# Patient Record
(demographics unavailable — no encounter records)

---

## 2017-03-15 NOTE — ERD
ER Documentation


Chief Complaint


Date/Time


DATE: 3/15/17 


TIME: 15:31


Chief Complaint


HEADACHE WITH COUGH AND CONGESTION WITH AP AND NAUSEA AND VOMITING





HPI


This is a 61-year-old female who presents to the emergency room for evaluation 

of a headache, nasal congestion, cough, body aches, and frequent urination for 

the past 3 days.  This patient is here with her daughter who states the patient 

has been stressed recently and has been in the hospital due to the fact that 

her family member is sick.  This patient has not had any fevers at home and 

came to the ER today for evaluation.  She denies coughing up any phlegm.





ROS


All systems reviewed and are negative except as per history of present illness.





PMhx/Soc


Medical and Surgical Hx:  pt denies Medical Hx


History of Surgery:  Yes (bunnion hysterectomy)


Anesthesia Reaction:  No


Hx Neurological Disorder:  No


Hx Respiratory Disorders:  No


Hx Cardiac Disorders:  No


Hx Psychiatric Problems:  No


Hx Miscellaneous Medical Probl:  No


Hx Alcohol Use:  No


Hx Substance Use:  No


Hx Tobacco Use:  No


Smoking Status:  Never smoker





Physical Exam


Vitals





Vital Signs








  Date Time  Temp Pulse Resp B/P Pulse Ox O2 Delivery O2 Flow Rate FiO2


 


3/15/17 12:26 99.9 105 22 129/72 98   








Physical Exam


INITIAL VITAL SIGNS: Reviewed by me


GENERAL:  The patient is well developed and appropriate for usual state of 

health in no apparent distress


HEENT: Pupils equal, round, and reactive to light.  EOMI. There is no scleral 

icterus.


NECK:  C-spine is soft and supple, there is no meningismus.  There is no 

cervical lymphadenopathy.


LUNGS:  Clear to auscultation bilaterally. There are no rales, wheezes or 

rhonchi.


HEART:  Regular rate and rhythm, no murmurs, clicks, rubs or gallops.


ABDOMEN:  Soft, non-tender, non-distended.  There are bowel sounds in all four 

quadrants. No rebound or guarding.


EXTREMITIES:  There is no peripheral cyanosis or edema.  No focal swelling or 

erythema.


NEUROLOGICAL:  The patient moves all four extremities with 5/5 strength.  

Cranial nerves II - XII are intact. Normal gait. Alert and oriented


SKIN:  There is no apparent rash or petechiae.


HEME/LYMPHATIC:  There is no evidence of excessive bruising or lymphedema.


PSYCHIATRIC:  The patient does not appear anxious or depressed.


Result Diagram:  


3/15/17 1415                                                                   

             3/15/17 1415





Results 24 hrs





 Laboratory Tests








Test


  3/15/17


14:15 3/15/17


15:05


 


Alanine Aminotransferase


(ALT/SGPT) 26IU/L 


  


 


 


Albumin 4.2g/dl  


 


Albumin/Globulin Ratio 1.23  


 


Alkaline Phosphatase 93IU/L  


 


Anion Gap 18  


 


Aspartate Amino Transf


(AST/SGOT) 28IU/L 


  


 


 


Basophils # 0.010^3/ul  


 


Basophils % 0.6%  


 


Blood Urea Nitrogen 16mg/dl  


 


Calcium Level 9.1mg/dl  


 


Carbon Dioxide Level 25mmol/L  


 


Chloride Level 102mmol/L  


 


Creatinine 0.83mg/dl  


 


Direct Bilirubin 0.00mg/dl  


 


Eosinophils # 0.010^3/ul  


 


Eosinophils % 0.0%  


 


Globulin 3.40g/dl  


 


Glucose Level 94mg/dl  


 


Hematocrit 42.3%  


 


Hemoglobin 14.6g/dl  


 


Indirect Bilirubin 0.3mg/dl  


 


Lipase 156U/L  


 


Lymphocytes # 1.110^3/ul  


 


Lymphocytes % 21.5%  


 


Mean Corpuscular Hemoglobin 31.3pg  


 


Mean Corpuscular Hemoglobin


Concent 34.5g/dl 


  


 


 


Mean Corpuscular Volume 90.8fl  


 


Mean Platelet Volume 10.7fl  


 


Monocytes # 0.710^3/ul  


 


Monocytes % 13.3%  


 


Neutrophils # 3.110^3/ul  


 


Neutrophils % 64.2%  


 


Nucleated Red Blood Cells # 0.010^3/ul  


 


Nucleated Red Blood Cells % 0.0/100WBC  


 


Platelet Count 85812^3/UL  


 


Potassium Level 4.7mmol/L  


 


Red Blood Count 4.6610^6/ul  


 


Red Cell Distribution Width 13.0%  


 


Sodium Level 140mmol/L  


 


Total Bilirubin 0.3mg/dl  


 


Total Protein 7.6g/dl  


 


White Blood Count 4.910^3/ul  


 


Urine Bacteria  FEW 


 


Urine Bilirubin  NEGATIVE 


 


Urine Clarity  CLEAR 


 


Urine Color  LT. YELLOW 


 


Urine Epithelial Cells  FEW 


 


Urine Glucose  NEGATIVE% 


 


Urine Hemoglobin  TRACE 


 


Urine Ketones  TRACE 


 


Urine Leukocyte Esterase  NEGATIVE 


 


Urine Microscopic RBC  0-2/HPF 


 


Urine Microscopic WBC  0-2/HPF 


 


Urine Nitrite  NEGATIVE 


 


Urine Specific Gravity  1.010 


 


Urine Total Protein  NEGATIVE 


 


Urine Urobilinogen  0.2  E.U./dL 


 


Urine pH  6.0 








 Current Medications








 Medications


  (Trade)  Dose


 Ordered  Sig/Larisa


 Route


 PRN Reason  Start Time


 Stop Time Status Last Admin


Dose Admin


 


 Sodium Chloride


  (NS)  1,000 ml @ 


 1,000 mls/hr  Q1H STAT


 IV


   3/15/17 14:03


 3/15/17 15:02 DC 3/15/17 14:40


 


 


 Ondansetron HCl


  (Zofran Inj)  4 mg  ONCE  STAT


 IV


   3/15/17 14:03


 3/15/17 14:05 DC 3/15/17 14:40


 


 


 Famotidine


  (Pepcid Iv)  20 mg  ONCE  STAT


 IV


   3/15/17 14:03


 3/15/17 14:05 DC 3/15/17 14:40


 











Procedures/MDM


Chest X-ray 1V Interpreted by me:


Soft Tissue:                                               No acute 

abnormalities


Bones:                                                    No acute abnormalities


Mediastinum/Cardiac Silhouette/Lungs:     [No acute abnormalities]





This 61-year-old female presents to the ER for evaluation of bilateral nasal 

congestion, cough, and generalized weakness.  And also frequent urination.  

This patient had lab work drawn including a chest x-ray all of which is normal.

  Urinalysis does not reveal any bacteria.  Urine culture was obtained.  I did 

advise this patient has sometimes urinalysis will not be the bacteria that 

would go along a urine culture.  This patient was given Robitussin and 

meclizine here in the emergency room for cough and dizziness.  The patient will 

be discharged home with a prescription for Robitussin, and ciprofloxacin to 

take over the course of the next 3 days.





Departure


Diagnosis:  


 Primary Impression:  


 Frequent urination


 Additional Impressions:  


 Multiple complaints


 Viral syndrome


Condition:  Stable











EUNICE PICKARD DO Mar 15, 2017 15:33

## 2017-03-15 NOTE — RADRPT
PROCEDURE:   Chest x-ray 

 

CLINICAL INDICATION:   Abdominal pain

 

TECHNIQUE:   Chest single view

 

COMPARISON:   None 

 

FINDINGS:

The heart is normal in size.  The pulmonary vessels are normal in caliber.  The lungs are clear.  Th
e costophrenic angles are sharp.  The visualized bony thorax is unremarkable. 

 

IMPRESSION:

 

No acute cardiopulmonary disease. 

 

 

RPTAT: HH

_____________________________________________ 

.Milad Menendez MD, MD           Date    Time 

Electronically viewed and signed by .Milad Menendez MD, MD on 03/15/2017 14:35 

 

D:  03/15/2017 14:35  T:  03/15/2017 14:35

.W/